# Patient Record
Sex: FEMALE | Race: WHITE | NOT HISPANIC OR LATINO | Employment: FULL TIME | URBAN - METROPOLITAN AREA
[De-identification: names, ages, dates, MRNs, and addresses within clinical notes are randomized per-mention and may not be internally consistent; named-entity substitution may affect disease eponyms.]

---

## 2022-12-17 ENCOUNTER — TELEPHONE (OUTPATIENT)
Dept: URGENT CARE | Facility: CLINIC | Age: 37
End: 2022-12-17

## 2023-01-10 ENCOUNTER — LAB (OUTPATIENT)
Dept: LAB | Facility: HOSPITAL | Age: 38
End: 2023-01-10
Payer: COMMERCIAL

## 2023-01-10 ENCOUNTER — OFFICE VISIT (OUTPATIENT)
Dept: INTERNAL MEDICINE | Facility: CLINIC | Age: 38
End: 2023-01-10
Payer: COMMERCIAL

## 2023-01-10 VITALS
SYSTOLIC BLOOD PRESSURE: 122 MMHG | OXYGEN SATURATION: 99 % | WEIGHT: 159 LBS | HEIGHT: 65 IN | BODY MASS INDEX: 26.49 KG/M2 | HEART RATE: 68 BPM | DIASTOLIC BLOOD PRESSURE: 82 MMHG

## 2023-01-10 DIAGNOSIS — Z78.9 OCCASIONAL ALCOHOL CONSUMPTION: ICD-10-CM

## 2023-01-10 DIAGNOSIS — Z13.21 ENCOUNTER FOR VITAMIN DEFICIENCY SCREENING: ICD-10-CM

## 2023-01-10 DIAGNOSIS — Z71.3 ENCOUNTER FOR DIETARY COUNSELING AND SURVEILLANCE: ICD-10-CM

## 2023-01-10 DIAGNOSIS — Z11.59 ENCOUNTER FOR HEPATITIS C SCREENING TEST FOR LOW RISK PATIENT: ICD-10-CM

## 2023-01-10 DIAGNOSIS — Z80.0 FAMILY HISTORY OF COLON CANCER: ICD-10-CM

## 2023-01-10 DIAGNOSIS — Q51.28 UTERUS DIDELPHYS: ICD-10-CM

## 2023-01-10 DIAGNOSIS — Z00.00 ANNUAL PHYSICAL EXAM: ICD-10-CM

## 2023-01-10 DIAGNOSIS — Z78.9 NON-SMOKER: ICD-10-CM

## 2023-01-10 DIAGNOSIS — Z13.29 SCREENING FOR THYROID DISORDER: ICD-10-CM

## 2023-01-10 DIAGNOSIS — Z76.89 ESTABLISHING CARE WITH NEW DOCTOR, ENCOUNTER FOR: ICD-10-CM

## 2023-01-10 DIAGNOSIS — D16.21 OSTEOCHONDROMA OF FEMUR, RIGHT: ICD-10-CM

## 2023-01-10 DIAGNOSIS — E66.3 OVERWEIGHT: ICD-10-CM

## 2023-01-10 DIAGNOSIS — Z13.1 SCREENING FOR DIABETES MELLITUS: ICD-10-CM

## 2023-01-10 DIAGNOSIS — Z13.220 SCREENING FOR LIPID DISORDERS: ICD-10-CM

## 2023-01-10 DIAGNOSIS — Z76.89 ESTABLISHING CARE WITH NEW DOCTOR, ENCOUNTER FOR: Primary | ICD-10-CM

## 2023-01-10 DIAGNOSIS — M79.604 RIGHT LEG PAIN: ICD-10-CM

## 2023-01-10 LAB
BILIRUB UR QL STRIP: NEGATIVE
CLARITY UR: CLEAR
COLOR UR: YELLOW
GLUCOSE UR STRIP-MCNC: NEGATIVE MG/DL
HGB UR QL STRIP.AUTO: NEGATIVE
KETONES UR QL STRIP: NEGATIVE
LEUKOCYTE ESTERASE UR QL STRIP.AUTO: NEGATIVE
NITRITE UR QL STRIP: NEGATIVE
PH UR STRIP.AUTO: 5.5 [PH] (ref 5–8)
PROT UR QL STRIP: NEGATIVE
SP GR UR STRIP: 1.03 (ref 1–1.03)
UROBILINOGEN UR QL STRIP: NORMAL

## 2023-01-10 PROCEDURE — 80061 LIPID PANEL: CPT

## 2023-01-10 PROCEDURE — 99213 OFFICE O/P EST LOW 20 MIN: CPT | Performed by: NURSE PRACTITIONER

## 2023-01-10 PROCEDURE — 84466 ASSAY OF TRANSFERRIN: CPT

## 2023-01-10 PROCEDURE — 82607 VITAMIN B-12: CPT

## 2023-01-10 PROCEDURE — 83540 ASSAY OF IRON: CPT

## 2023-01-10 PROCEDURE — 86803 HEPATITIS C AB TEST: CPT

## 2023-01-10 PROCEDURE — 96127 BRIEF EMOTIONAL/BEHAV ASSMT: CPT | Performed by: NURSE PRACTITIONER

## 2023-01-10 PROCEDURE — 82746 ASSAY OF FOLIC ACID SERUM: CPT

## 2023-01-10 PROCEDURE — 81003 URINALYSIS AUTO W/O SCOPE: CPT

## 2023-01-10 PROCEDURE — 86708 HEPATITIS A ANTIBODY: CPT

## 2023-01-10 PROCEDURE — 83036 HEMOGLOBIN GLYCOSYLATED A1C: CPT

## 2023-01-10 PROCEDURE — 86709 HEPATITIS A IGM ANTIBODY: CPT

## 2023-01-10 PROCEDURE — 86704 HEP B CORE ANTIBODY TOTAL: CPT

## 2023-01-10 PROCEDURE — 86706 HEP B SURFACE ANTIBODY: CPT

## 2023-01-10 PROCEDURE — 80050 GENERAL HEALTH PANEL: CPT

## 2023-01-10 PROCEDURE — 99385 PREV VISIT NEW AGE 18-39: CPT | Performed by: NURSE PRACTITIONER

## 2023-01-10 PROCEDURE — 87340 HEPATITIS B SURFACE AG IA: CPT

## 2023-01-10 PROCEDURE — 82306 VITAMIN D 25 HYDROXY: CPT

## 2023-01-10 NOTE — PROGRESS NOTES
Annual Physical     Name: Claudia Yanez    : 1985     MRN: [id number]     Chief Complaint  Establish Care, Annual Exam, and Leg Pain (Right)    Subjective     History of Present Illness:  Claudia Yanez is a 37 y.o. female who presents today for establish care with new provider and annual visit    Patient recently moved to the area about 4 months ago from [place]    She is not currently on any prescribed medication    A significant past medical history does include a removal of a osteochondroma in  from the right femur.  She recently had an increase in symptoms and sought treatment from [facility] who referred her to general surgery who she will see next week.     Family history includes a grandmother with colon cancer    Significant surgeries include the removal of the osteochondroma as well as a tonsillectomy    Patient does not smoke or use nicotine.  No drug use.  Occasional alcohol use    Patient does not follow any particular diet or exercise pattern she is not able to describe her lifestyle is healthy eating    Patient has on her to do list to set up with dentist and eye doctor.  Prior to moving here she was established and have regular visits    Last Pap smear was 3 years ago.  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  She has received the COVID-vaccine.  She has had a tetanus shot within the last 5 years.      The patient is being seen for a health maintenance evaluation.    Past Medical History:   Diagnosis Date   • Allergic 2005    Penicillin   • History of medical problems Uterus didelphys    2014   • Osteochondroma    • Vertigo        History reviewed. No pertinent surgical history.    Social History     Socioeconomic History   • Marital status:    Tobacco Use   • Smoking status: Never   • Smokeless tobacco: Never   Vaping Use   • Vaping Use: Never used   Substance and Sexual Activity   • Alcohol use: Yes      Alcohol/week: 6.0 standard drinks     Types: 5 Glasses of wine, 1 Shots of liquor per week     Comment: couple times per week   • Drug use: Never   • Sexual activity: Yes     Partners: Male     Birth control/protection: Condom         Current Outpatient Medications:   •  Diclofenac Sodium (VOLTAREN) 1 % gel gel, Apply 4 g topically to the appropriate area as directed 4 (Four) Times a Day As Needed (musculoskeletal pain)., Disp: 100 g, Rfl: 0    General History  Claudia  does have regular dental visits.  It is on her to do list to get this set up this week  She does complain of vision problems. Last eye exam was it is on her to do list to get this set up this week.  Recently moved from [place] and is up-to-date with this.  Immunizations are up to date. The patient needs the following immunizations: Received flu shot last year.  She is up-to-date with her COVID vaccines.  Tetanus shot within the last 5 years    Lifestyle  Claudia  consumes in general, an \"unhealthy\" diet.  She exercises intermittently.    Reproductive Health  Claudia  is premenopausal.  She reports periods are regular every 28-30 days.  She is sexually active. Her contraceptive plan is condoms.    Screening  Last pap was 3 years ago  Last Completed Pap Smear     This patient has no relevant Health Maintenance data.      . History of abnormal pap smear or family history of gyn cancer: None  Last mammogram was never  Last Completed Mammogram     This patient has no relevant Health Maintenance data.      . Personal or family history of abnormal mammograms or breast cancer: None  Last colonoscopy was never  Last Completed Colonoscopy     This patient has no relevant Health Maintenance data.      . Family history of colon cancer: Grandmother  Last DEXA was never.    Advance Care Planning   ACP discussion was held with the patient during this visit. Patient does not have an advance directive, declines further assistance.       Health Maintenance Summary           Overdue - COVID-19 Vaccine (1) Overdue - never done    No completion, postpone, or frequency change history exists for this topic.          Overdue - HEPATITIS C SCREENING (Once) Overdue - never done    No completion, postpone, or frequency change history exists for this topic.          Overdue - PAP SMEAR (Every 3 Years) Overdue - never done    No completion, postpone, or frequency change history exists for this topic.          ANNUAL PHYSICAL (Yearly) Next due on 1/10/2024    01/10/2023  Done          TDAP/TD VACCINES (2 - Td or Tdap) Next due on 6/5/2028 06/05/2018  Imm Admin: Tdap          INFLUENZA VACCINE  Completed    11/15/2022  Imm Admin: Influenza, Unspecified          Pneumococcal Vaccine 0-64 (Series Information) Aged Out    No completion, postpone, or frequency change history exists for this topic.              Immunization History   Administered Date(s) Administered   • Influenza, Unspecified 11/15/2022   • Tdap 06/05/2018       Review of Systems   Constitutional: Negative for fatigue and fever.        Establish care  Annual visit   Respiratory: Negative for cough.    Cardiovascular: Negative for chest pain.   Musculoskeletal: Negative for arthralgias.        Right leg pain   Allergic/Immunologic: Negative for immunocompromised state.   Neurological: Negative for headaches.   Psychiatric/Behavioral: Negative for suicidal ideas. The patient is not nervous/anxious.        Objective     Vital Signs  /82   Pulse 68   Ht 165.1 cm (65\")   Wt 72.1 kg (159 lb)   SpO2 99%   BMI 26.46 kg/m²   Estimated body mass index is 26.46 kg/m² as calculated from the following:    Height as of this encounter: 165.1 cm (65\").    Weight as of this encounter: 72.1 kg (159 lb).    BMI is >= 25 and <30. (Overweight) The following options were offered after discussion;: exercise counseling/recommendations and nutrition counseling/recommendations      PHQ-9 Depression Screening  Little interest or pleasure in  doing things? 0-->not at all   Feeling down, depressed, or hopeless? 0-->not at all   Trouble falling or staying asleep, or sleeping too much?     Feeling tired or having little energy?     Poor appetite or overeating?     Feeling bad about yourself - or that you are a failure or have let yourself or your family down?     Trouble concentrating on things, such as reading the newspaper or watching television?     Moving or speaking so slowly that other people could have noticed? Or the opposite - being so fidgety or restless that you have been moving around a lot more than usual?     Thoughts that you would be better off dead, or of hurting yourself in some way?     PHQ-9 Total Score 0   If you checked off any problems, how difficult have these problems made it for you to do your work, take care of things at home, or get along with other people?       PHQ-9 Total Score: 0    SOL-7  Feeling nervous, anxious or on edge: Not at all  Not being able to stop or control worrying: Not at all  Worrying too much about different things: Not at all  Trouble Relaxing: Not at all  Being so restless that it is hard to sit still: Not at all  Feeling afraid as if something awful might happen: Not at all  Becoming easily annoyed or irritable: Not at all  SOL 7 Total Score: 0  If you checked any problems, how difficult have these problems made it for you to do your work, take care of things at home, or get along with other people: Not difficult at all    Physical Exam  Vitals and nursing note reviewed.   Constitutional:       Appearance: Normal appearance.   HENT:      Head: Normocephalic and atraumatic.      Right Ear: Hearing, tympanic membrane, ear canal and external ear normal.      Left Ear: Hearing, tympanic membrane, ear canal and external ear normal.      Mouth/Throat:      Lips: Pink.      Mouth: Mucous membranes are moist.      Pharynx: Oropharynx is clear.   Eyes:      Extraocular Movements: Extraocular movements intact.       Pupils: Pupils are equal, round, and reactive to light.   Cardiovascular:      Rate and Rhythm: Normal rate and regular rhythm.      Pulses: Normal pulses.           Radial pulses are 2+ on the right side and 2+ on the left side.        Posterior tibial pulses are 2+ on the right side and 2+ on the left side.      Heart sounds: Normal heart sounds, S1 normal and S2 normal.   Pulmonary:      Effort: Pulmonary effort is normal.      Breath sounds: Normal breath sounds.   Abdominal:      General: Bowel sounds are normal.      Palpations: Abdomen is soft.   Musculoskeletal:         General: Normal range of motion.      Comments: Right leg/femur pain noted   Skin:     General: Skin is warm and dry.   Neurological:      Mental Status: She is alert and oriented to person, place, and time.   Psychiatric:         Mood and Affect: Mood normal.         Behavior: Behavior normal.         Thought Content: Thought content normal.         Judgment: Judgment normal.                   Assessment and Plan     Diagnoses and all orders for this visit:    1. Establishing care with new doctor, encounter for (Primary)  -     CBC (No Diff); Future  -     Comprehensive Metabolic Panel; Future  -     Iron Profile; Future  -     Vitamin B12 & Folate; Future  -     Vitamin D,25-Hydroxy; Future  -     Urinalysis With Culture If Indicated -; Future    2. Annual physical exam  -     CBC (No Diff); Future  -     Comprehensive Metabolic Panel; Future  -     Iron Profile; Future  -     Vitamin B12 & Folate; Future  -     Vitamin D,25-Hydroxy; Future  -     Urinalysis With Culture If Indicated -; Future    3. Osteochondroma of femur, right  -     XR Femur 2 View Right (In Office); Future  -     MRI femur right w wo contrast; Future    4. Right leg pain  -     XR Femur 2 View Right (In Office); Future  -     MRI femur right w wo contrast; Future    5. Family history of colon cancer    6. Non-smoker    7. Occasional alcohol consumption    8. Encounter  for vitamin deficiency screening  -     Iron Profile; Future  -     Vitamin B12 & Folate; Future  -     Vitamin D,25-Hydroxy; Future    9. Encounter for hepatitis C screening test for low risk patient  -     Viral Hepatitis Screening and Diagnosis (HAV, HBV, HCV); Future    10. Screening for thyroid disorder  -     TSH Rfx On Abnormal To Free T4; Future    11. Screening for lipid disorders  -     Lipid Panel; Future    12. Screening for diabetes mellitus  -     Hemoglobin A1c; Future    13. Overweight    14. BMI 26.0-26.9,adult    15. Encounter for dietary counseling and surveillance    16. Uterus didelphys    Plan  Very glad patient is here today to establish care and here for her annual visit    Will order labs and patient will have obtained today.  Will notify of results    Please keep upcoming appointment with general surgery    Highly recommend that she keep upcoming appointment/make appointment with dentist, eye doctor, OB/GYN    Continue with healthy lifestyle  Counseled patient regarding multimodal approach with healthy nutrition, healthy sleep, regular physical activity, social activities, counseling, safety measures and medications.     Go to ER if any condition worsens or severe    Patient felt comfortable waiting to be seen again in 1 year for annual visit.  She will schedule an appointment for any additional needs      Addendum: Patient called back requesting orders for testing of the right leg.  With her history of osteochondroma and right leg pain it would be wise to get an x-ray and further MRI.  This will also be needed for any of her upcoming appointments with specialist.  Orders were placed an additional E and M code was added    Addendum: Will cancel x-ray order as patient recently had x-ray obtained at urgent care.        Follow Up  Return for annual 1 year- 30 minutes.    GEORGE Alfaro    Part of this note may be an electronic transcription/translation of spoken language to printed  text using the Dragon Dictation System.

## 2023-01-11 ENCOUNTER — TELEPHONE (OUTPATIENT)
Dept: URGENT CARE | Facility: CLINIC | Age: 38
End: 2023-01-11
Payer: COMMERCIAL

## 2023-01-11 ENCOUNTER — TELEPHONE (OUTPATIENT)
Dept: INTERNAL MEDICINE | Facility: CLINIC | Age: 38
End: 2023-01-11
Payer: COMMERCIAL

## 2023-01-11 LAB
25(OH)D3 SERPL-MCNC: 24.1 NG/ML (ref 30–100)
ALBUMIN SERPL-MCNC: 4.3 G/DL (ref 3.5–5.2)
ALBUMIN/GLOB SERPL: 1.4 G/DL
ALP SERPL-CCNC: 65 U/L (ref 39–117)
ALT SERPL W P-5'-P-CCNC: 17 U/L (ref 1–33)
ANION GAP SERPL CALCULATED.3IONS-SCNC: 9.5 MMOL/L (ref 5–15)
AST SERPL-CCNC: 17 U/L (ref 1–32)
BILIRUB SERPL-MCNC: 0.3 MG/DL (ref 0–1.2)
BUN SERPL-MCNC: 16 MG/DL (ref 6–20)
BUN/CREAT SERPL: 19.8 (ref 7–25)
CALCIUM SPEC-SCNC: 9.9 MG/DL (ref 8.6–10.5)
CHLORIDE SERPL-SCNC: 104 MMOL/L (ref 98–107)
CHOLEST SERPL-MCNC: 220 MG/DL (ref 0–200)
CO2 SERPL-SCNC: 26.5 MMOL/L (ref 22–29)
CREAT SERPL-MCNC: 0.81 MG/DL (ref 0.57–1)
DEPRECATED RDW RBC AUTO: 41.4 FL (ref 37–54)
EGFRCR SERPLBLD CKD-EPI 2021: 96.6 ML/MIN/1.73
ERYTHROCYTE [DISTWIDTH] IN BLOOD BY AUTOMATED COUNT: 12.1 % (ref 12.3–15.4)
FOLATE SERPL-MCNC: 11.8 NG/ML (ref 4.78–24.2)
GLOBULIN UR ELPH-MCNC: 3.1 GM/DL
GLUCOSE SERPL-MCNC: 73 MG/DL (ref 65–99)
HAV AB SER QL IA: POSITIVE
HAV IGM SERPL QL IA: NEGATIVE
HBA1C MFR BLD: 5.1 % (ref 4.8–5.6)
HBV CORE AB SERPL QL IA: NEGATIVE
HBV SURFACE AB SER QL: REACTIVE
HBV SURFACE AG SERPL QL IA: NEGATIVE
HCT VFR BLD AUTO: 41.8 % (ref 34–46.6)
HCV AB S/CO SERPL IA: <0.1 S/CO RATIO (ref 0–0.9)
HCV AB SERPL QL IA: NORMAL
HDLC SERPL-MCNC: 83 MG/DL (ref 40–60)
HGB BLD-MCNC: 13.8 G/DL (ref 12–15.9)
IMP & REVIEW OF LAB RESULTS: ABNORMAL
IRON 24H UR-MRATE: 161 MCG/DL (ref 37–145)
IRON SATN MFR SERPL: 42 % (ref 20–50)
LABORATORY COMMENT REPORT: ABNORMAL
LDLC SERPL CALC-MCNC: 129 MG/DL (ref 0–100)
LDLC/HDLC SERPL: 1.53 {RATIO}
MCH RBC QN AUTO: 31 PG (ref 26.6–33)
MCHC RBC AUTO-ENTMCNC: 33 G/DL (ref 31.5–35.7)
MCV RBC AUTO: 93.9 FL (ref 79–97)
PLATELET # BLD AUTO: 350 10*3/MM3 (ref 140–450)
PMV BLD AUTO: 10.4 FL (ref 6–12)
POTASSIUM SERPL-SCNC: 4.1 MMOL/L (ref 3.5–5.2)
PROT SERPL-MCNC: 7.4 G/DL (ref 6–8.5)
RBC # BLD AUTO: 4.45 10*6/MM3 (ref 3.77–5.28)
SODIUM SERPL-SCNC: 140 MMOL/L (ref 136–145)
TIBC SERPL-MCNC: 384 MCG/DL (ref 298–536)
TRANSFERRIN SERPL-MCNC: 258 MG/DL (ref 200–360)
TRIGL SERPL-MCNC: 48 MG/DL (ref 0–150)
TSH SERPL DL<=0.05 MIU/L-ACNC: 3.99 UIU/ML (ref 0.27–4.2)
VIT B12 BLD-MCNC: 431 PG/ML (ref 211–946)
VLDLC SERPL-MCNC: 8 MG/DL (ref 5–40)
WBC NRBC COR # BLD: 7.42 10*3/MM3 (ref 3.4–10.8)

## 2023-01-11 NOTE — TELEPHONE ENCOUNTER
Patient called and said that she needed an MRI referral, contacted Riri Lanec at her PCP office and she is working on it for her and I explained to the patient that we don't do the referral for the MRI and if she had anymore questions or concerns to please call me back and I would do everything I could to make sure she gets help with the referral. [facility]

## 2023-01-11 NOTE — TELEPHONE ENCOUNTER
Patient was referred to ortho for evaluation for osteochondroma.  Ortho sent patient to General Surgery for evaluation.  General Surgery called patient and stated that she needed to have MRI to get definitive dx as to what speciality she needs to see.  [facility] can't order MRI.  She had an appointment on 01/10/2023 for physical.

## 2023-01-11 NOTE — TELEPHONE ENCOUNTER
----- Message from GEORGE Alfaro sent at 1/11/2023 10:24 AM EST -----  Please let patient know that her labs returned. There are a few noted abrnomalities  Her vitamin D is low at 24 when lower end of normal is 30. I would recommend starting on a once daily supplement OTC  Lipid panel does show some elevations. Continue with healthy low fat diet  Iron is elevated a little outside of normal.   We will continue to monitor labs work at next visit. Do you feel comfortable waiting one year to recheck labs or would you like us to set up a sooner appointment?

## 2023-01-11 NOTE — TELEPHONE ENCOUNTER
I called and spoke with patient, I advised her of all labs and she verbalized understanding. She does want to come in sooner than 1 year to get things rechecked as long as her insurance will cover.    Do you want her scheduled to come back in 6 months?  Please advise

## 2023-01-12 ENCOUNTER — TELEPHONE (OUTPATIENT)
Dept: INTERNAL MEDICINE | Facility: CLINIC | Age: 38
End: 2023-01-12
Payer: COMMERCIAL

## 2023-01-12 DIAGNOSIS — R89.9 ABNORMAL LABORATORY TEST RESULT: ICD-10-CM

## 2023-01-12 DIAGNOSIS — E78.2 MIXED HYPERLIPIDEMIA: Primary | ICD-10-CM

## 2023-01-12 NOTE — TELEPHONE ENCOUNTER
----- Message from GEORGE Alfaro sent at 1/12/2023  9:58 AM EST -----  Please let patient know that hepatitis panel returned.  She is negative for hepatitis C.  The hepatitis B profile does show immunity via vaccination.  The hepatitis A antibody is positive but IgM is negative meaning no acute infection.  Again this could be related to previous vaccination of hepatitis A or previous infection.  This illness was big in our area a few years ago.  It is very similar to a GI bug.  Thank you

## 2023-02-11 PROBLEM — Z78.9 NON-SMOKER: Status: RESOLVED | Noted: 2023-01-10 | Resolved: 2023-02-11

## 2023-02-11 PROBLEM — Z01.419 WELL WOMAN EXAM: Status: ACTIVE | Noted: 2023-02-11

## 2023-02-14 ENCOUNTER — OFFICE VISIT (OUTPATIENT)
Dept: OBSTETRICS AND GYNECOLOGY | Facility: CLINIC | Age: 38
End: 2023-02-14
Payer: COMMERCIAL

## 2023-02-14 VITALS
DIASTOLIC BLOOD PRESSURE: 80 MMHG | RESPIRATION RATE: 14 BRPM | SYSTOLIC BLOOD PRESSURE: 118 MMHG | WEIGHT: 154 LBS | BODY MASS INDEX: 25.63 KG/M2

## 2023-02-14 DIAGNOSIS — Z01.419 WELL WOMAN EXAM: Primary | ICD-10-CM

## 2023-02-14 DIAGNOSIS — Z71.85 VACCINE COUNSELING: ICD-10-CM

## 2023-02-14 PROBLEM — Q51.28 UTERUS DIDELPHYS: Status: RESOLVED | Noted: 2023-01-10 | Resolved: 2023-02-14

## 2023-02-14 PROBLEM — D16.21 OSTEOCHONDROMA OF FEMUR, RIGHT: Status: RESOLVED | Noted: 2023-01-10 | Resolved: 2023-02-14

## 2023-02-14 PROCEDURE — 99385 PREV VISIT NEW AGE 18-39: CPT | Performed by: OBSTETRICS & GYNECOLOGY

## 2023-02-14 RX ORDER — ACYCLOVIR 400 MG/1
400 TABLET ORAL 3 TIMES DAILY
Qty: 15 TABLET | Refills: 2 | Status: SHIPPED | OUTPATIENT
Start: 2023-02-14

## 2023-02-14 RX ORDER — NORGESTIMATE AND ETHINYL ESTRADIOL 7DAYSX3 28
1 KIT ORAL DAILY
Qty: 84 TABLET | Refills: 4 | Status: SHIPPED | OUTPATIENT
Start: 2023-02-14

## 2023-02-14 NOTE — PATIENT INSTRUCTIONS
When initially starting birth control pills, the first pilll should be taken on the Sunday following the onset of your next cycle.  For maximum effectiveness, it should be taken the same time each day.  Because it may take 1 month to become effective, you should use of alternative contraception for the first month.  There is the potential for breakthrough bleeding to occur for up to 4 cycles.     Should you fail to take your birth control on time:    If you miss a single pill, take it immediately that day.  If you an entire day, take the pill you missed in addition to the pill you are scheduled to take.  If you miss 2 consecutive pills, take the extra pill each of the next 2 days.  If you ever miss 3 consecutive pills, discard the remainder of the pack and start a new pack of pills the Sunday after the next menses begins.  Should you have any questions about how to manage this, call the office during office hours and we will review this with you

## 2023-02-14 NOTE — PROGRESS NOTES
Subjective   Chief Complaint   Patient presents with   • Gynecologic Exam     Claudia Yanez is a 37 y.o. year old  presenting to be seen for her annual exam.  She has a known uterine didelphys.  It was found incidentally back in  roughly when she had an MRI done to look at an osteochondroma involving the right femur.  She believes she has to totally separate cavities including 2 separate cervices.  She believes there is a septum in the upper portion of the vagina.  She is also had imaging involving the kidney and believes she has 2 is normal kidneys.    She does have a remote history of genital herpes that she rarely has outbreaks.  She would like refills for acyclovir in the event an outbreak does happen.    SEXUAL Hx:  She is currently sexually active.  In the past year there there has been NO new sexual partners.    Condoms are always used.  She would not like to be screened for STD's at today's exam.  Current birth control method: condoms.  She is happy with her current method of contraception and does want to discuss alternative methods of contraception.  MENSTRUAL Hx:  Patient's last menstrual period was 2023 (approximate).  In the past 6 months her cycles have been regular, predictable and occur monthly.  Her menstrual flow is typically normal.   Each month on average there are roughly 0 day(s) of very heavy flow.  Intermenstrual bleeding is absent.    Post-coital bleeding is absent.  Dysmenorrhea: is not affecting her activities of daily living  PMS: is not affecting her activities of daily living  Her cycles are not a source of concern for her that she wishes to discuss today.  HEALTH Hx:  She exercises regularly: yes.  She wears her seat belt: yes.  She has concerns about domestic violence: no.  OTHER THINGS SHE WANTS TO DISCUSS TODAY:  Nothing else    The following portions of the patient's history were reviewed and updated as appropriate:problem list, current medications, allergies,  past family history, past medical history, past social history and past surgical history.    Social History    Tobacco Use      Smoking status: Never      Smokeless tobacco: Never    Review of Systems  Constitutional POS: nothing reported    NEG: anorexia or night sweats   Genitourinary POS: nothing reported    NEG: dysuria or hematuria      Gastointestinal POS: nothing reported    NEG: bloating, change in bowel habits, melena or reflux symptoms   Integument POS: nothing reported    NEG: moles that are changing in size, shape, color or rashes   Breast POS: nothing reported    NEG: persistent breast lump, skin dimpling or nipple discharge        Objective   /80   Resp 14   Wt 69.9 kg (154 lb)   LMP 01/29/2023 (Approximate)   BMI 25.63 kg/m²     General:  well developed; well nourished  no acute distress   Skin:  No suspicious lesions seen   Thyroid: normal to inspection and palpation   Breasts:  Examined in supine position  Symmetric without masses or skin dimpling  Nipples normal without inversion, lesions or discharge  There are no palpable axillary nodes   Abdomen: soft, non-tender; no masses  no umbilical or inguinal hernias are present  no hepato-splenomegaly   Pelvis: Clinical staff was present for exam  External genitalia:  normal appearance of the external genitalia including Bartholin's and Wyldwood's glands.  :  urethral meatus normal;  Vaginal:  normal pink mucosa without prolapse or lesions. Septum is present in the upper one quarter of the vagina only  Cervix:  2 independent services are seen at the apex just on either side of the small apical septum  Uterus:  normal size, shape and consistency.  Adnexa:  normal bimanual exam of the adnexa.  Rectal:  digital rectal exam not performed; anus visually normal appearing.        Assessment   1. Uterine didelphysis  2. History of herpes with infrequent outbreaks taking episodic treatment only when lesions occur     Plan   1. Pap was done today.  If she  does not receive the results of the Pap within 2 weeks  time, she was instructed to call to find out the results.  I explained to Claudia that the recommendations for Pap smear interval in a low risk patient's has lengthened to 3 years time.  I encouraged her to be seen yearly for a full physical exam including breast and pelvic exam even during the off years when PAP's will not be performed.  2. Her vaccine record was reviewed and updated.  3. I discussed with Claudia that she may be behind on needed vaccinations for COVID booster.  She may be able to obtain these vaccinations at her local pharmacy OR speak about obtaining them with her primary care.  If she does obtain her vaccines, I have asked Claudia to let us know the date each vaccine was obtained so that her medical record could be updated in our system.  4. She was instructed how to start her birth control.  It should be started on Sunday following the onset of her next cycle.  Because it may take 1 month to become effective, the use of alternative contraception for one month was stressed.  The potential for breakthrough bleeding for up to 4 cycles was also emphasized.  5. The importance of keeping all planned follow-up and taking all medications as prescribed was emphasized.  6. Follow up for annual exam 1 year    New Medications Ordered This Visit   Medications   • norgestimate-ethinyl estradiol (Ortho Tri-Cyclen, 28,) 0.18/0.215/0.25 MG-35 MCG per tablet     Sig: Take 1 tablet by mouth Daily.     Dispense:  84 tablet     Refill:  4   • acyclovir (ZOVIRAX) 400 MG tablet     Sig: Take 1 tablet by mouth 3 (Three) Times a Day.     Dispense:  15 tablet     Refill:  2          This note was electronically signed.    Jeffrey Huitron M.D.  February 14, 2023    Part of this note may be an electronic transcription/translation of spoken language to printed text using the Dragon Dictation System.

## 2023-02-15 ENCOUNTER — PATIENT ROUNDING (BHMG ONLY) (OUTPATIENT)
Dept: OBSTETRICS AND GYNECOLOGY | Facility: CLINIC | Age: 38
End: 2023-02-15
Payer: COMMERCIAL

## 2023-02-15 ENCOUNTER — HOSPITAL ENCOUNTER (OUTPATIENT)
Dept: MRI IMAGING | Facility: HOSPITAL | Age: 38
Discharge: HOME | End: 2023-02-15
Admitting: NURSE PRACTITIONER
Payer: COMMERCIAL

## 2023-02-15 DIAGNOSIS — M79.604 RIGHT LEG PAIN: ICD-10-CM

## 2023-02-15 DIAGNOSIS — D16.21 OSTEOCHONDROMA OF FEMUR, RIGHT: ICD-10-CM

## 2023-02-15 PROCEDURE — 0 GADOBENATE DIMEGLUMINE 529 MG/ML SOLUTION: Performed by: NURSE PRACTITIONER

## 2023-02-15 PROCEDURE — A9577 INJ MULTIHANCE: HCPCS | Performed by: NURSE PRACTITIONER

## 2023-02-15 PROCEDURE — 73720 MRI LWR EXTREMITY W/O&W/DYE: CPT

## 2023-02-15 RX ADMIN — GADOBENATE DIMEGLUMINE 14 ML: 529 INJECTION, SOLUTION INTRAVENOUS at 19:24

## 2023-02-15 NOTE — PROGRESS NOTES
A Yext message has been sent to the patient for PATIENT ROUNDING with [facility].

## 2023-02-16 LAB — REF LAB TEST METHOD: NORMAL

## 2023-02-17 ENCOUNTER — TELEPHONE (OUTPATIENT)
Dept: INTERNAL MEDICINE | Facility: CLINIC | Age: 38
End: 2023-02-17
Payer: COMMERCIAL

## 2023-02-17 DIAGNOSIS — D16.21 OSTEOCHONDROMA OF FEMUR, RIGHT: ICD-10-CM

## 2023-02-17 DIAGNOSIS — Z76.89 ENCOUNTER FOR SKIN CARE: Primary | ICD-10-CM

## 2023-02-17 NOTE — PROGRESS NOTES
Called and informed patient of referral and advised her to call ortho back to reschedule appt.  Verbalized understanding.

## 2023-02-17 NOTE — TELEPHONE ENCOUNTER
----- Message from GEORGE Alfaro sent at 2/17/2023 12:07 PM EST -----  Please let patient know that MRI resulted.  It did not have an exact clear-cut impression/result.  It did show an irregular non-mass that was nonspecific that could reflect subcutaneous scar or less likely focal cellulitis or fat necrosis.  There was no discrete or drainable fluid collection.  Grossly unremarkable of the bone structures.  They did consider a dermatology referral as a suggestion.  I know that we placed this MRI as it needed to be completed before the Ortho appointment.  Have they provided you with that information?  Would you like me to place a referral for dermatology?

## 2023-02-17 NOTE — TELEPHONE ENCOUNTER
Spoke with patient and explained MRI results and recommendations.  Would like to proceed with derm referral.  Has not heard back from ortho since they had canceled her appointment.

## 2025-01-21 ENCOUNTER — OFFICE VISIT (OUTPATIENT)
Dept: OBSTETRICS AND GYNECOLOGY | Facility: CLINIC | Age: 40
End: 2025-01-21
Payer: COMMERCIAL

## 2025-01-21 VITALS
RESPIRATION RATE: 14 BRPM | WEIGHT: 177 LBS | SYSTOLIC BLOOD PRESSURE: 118 MMHG | DIASTOLIC BLOOD PRESSURE: 80 MMHG | BODY MASS INDEX: 29.45 KG/M2

## 2025-01-21 DIAGNOSIS — Z01.419 WELL WOMAN EXAM: Primary | ICD-10-CM

## 2025-01-21 DIAGNOSIS — Z71.85 VACCINE COUNSELING: ICD-10-CM

## 2025-01-21 PROCEDURE — 99395 PREV VISIT EST AGE 18-39: CPT | Performed by: OBSTETRICS & GYNECOLOGY

## 2025-01-21 NOTE — PROGRESS NOTES
Subjective   Chief Complaint   Patient presents with    Gynecologic Exam     Claudia Yanez is a 39 y.o. year old  presenting to be seen for her annual exam.     SEXUAL Hx:  She is currently sexually active.  In the past year there there has been NO new sexual partners.    Condoms are always used.  She would not like to be screened for STD's at today's exam.  Current birth control method: condoms.  She is happy with her current method of contraception and does not want to discuss alternative methods of contraception.  MENSTRUAL Hx:  Patient's last menstrual period was 2025 (approximate).  In the past 6 months her cycles have been regular, predictable and occur monthly.  Her menstrual flow is typically normal.   Each month on average there are roughly 0 day(s) of very heavy flow.  Intermenstrual bleeding is absent.    Post-coital bleeding is absent.  Dysmenorrhea: is not affecting her activities of daily living  PMS: is not affecting her activities of daily living  Her cycles are not a source of concern for her that she wishes to discuss today.  HEALTH Hx:  She exercises regularly: yes.  She wears her seat belt: yes.  She has concerns about domestic violence: no.    The following portions of the patient's history were reviewed and updated as appropriate:problem list, current medications, allergies, past family history, past medical history, past social history, and past surgical history.    Social History    Tobacco Use      Smoking status: Never      Smokeless tobacco: Never    Review of Systems  Constitutional POS: nothing reported    NEG: anorexia or night sweats   Genitourinary POS: nothing reported    NEG: dysuria or hematuria      Gastointestinal POS: nothing reported    NEG: bloating, change in bowel habits, melena, or reflux symptoms   Integument POS: nothing reported and she does not routinely see a dermatologist for screening skin exams    NEG: moles that are changing in size, shape, color or  rashes   Breast POS: nothing reported    NEG: persistent breast lump, skin dimpling, or nipple discharge        Objective   /80   Resp 14   Wt 80.3 kg (177 lb)   LMP 01/06/2025 (Approximate)   BMI 29.45 kg/m²     General:  well developed; well nourished  no acute distress  mentation appropriate   Skin:  No suspicious lesions seen   Thyroid: normal to inspection and palpation   Breasts:  Examined in supine position  Symmetric without masses or skin dimpling  Nipples normal without inversion, lesions or discharge  There are no palpable axillary nodes   Abdomen: soft, non-tender; no masses  no umbilical or inguinal hernias are present  no hepato-splenomegaly   Pelvis: Clinical staff was present for exam  External genitalia:  normal appearance of the external genitalia including Bartholin's and Chappell's glands.  :  urethral meatus normal;  Vaginal:  normal pink mucosa without prolapse or lesions.  Small flexible septum is present in the upper one third  Cervix:  2 normal cervices are present on either side of the septum  Uterus:  normal size, shape and consistency.  Adnexa:  normal bimanual exam of the adnexa.  Rectal:  digital rectal exam not performed; anus visually normal appearing.        Assessment   Normal GYN exam with uterine didelphys     Plan   Pap was not done today.  I explained to Claudia that the recommendations for Pap smear interval in a low risk patient has lengthened to 3 years time.  I told Claudia she still needs to be seen in our office yearly for a full physical including breast and pelvic exam.  I have counseled the patient on the importance of staying up to date with preventive vaccines as well as the risks and benefits of these vaccines.  Her vaccine record was reviewed and updated.  I discussed with Claudia that she may be behind on needed vaccinations for Influenza.  She may be able to obtain these vaccinations at her local pharmacy OR speak about obtaining them with her primary care.   If she does obtain her vaccines, I have asked Claudia to let us know the date each vaccine was obtained so that her medical record could be updated in our system.  The importance of keeping all planned follow-up and taking all medications as prescribed was emphasized.  Follow up for annual exam 1 year           This note was electronically signed.    Jeffrey Huitron M.D.  January 21, 2025    Part of this note may be an electronic transcription/translation of spoken language to printed text using the Dragon Dictation System.

## 2025-01-21 NOTE — Clinical Note
We saw her in 2023.  She has a condition which she has to cervixes.  Pap was done of both cervix is probably only received the result from her left cervix.  Can you call pathology and cytology labs and find out if they have a copy of both the left and right cervix and get it for us if they do?  Thank you

## 2025-01-22 ENCOUNTER — TELEPHONE (OUTPATIENT)
Dept: OBSTETRICS AND GYNECOLOGY | Facility: CLINIC | Age: 40
End: 2025-01-22
Payer: COMMERCIAL

## 2025-01-22 NOTE — TELEPHONE ENCOUNTER
Please let Claudia know that we heard back from pathology and for what ever reason the Pap smear was only done on one of her 2 cervix's.  I think she is fine to wait until next year but she would like to come in and have a Pap smear of both cervixes this year at no charge for the office visit, happy to do that.